# Patient Record
Sex: FEMALE | Race: OTHER | URBAN - METROPOLITAN AREA
[De-identification: names, ages, dates, MRNs, and addresses within clinical notes are randomized per-mention and may not be internally consistent; named-entity substitution may affect disease eponyms.]

---

## 2018-04-05 ENCOUNTER — APPOINTMENT (RX ONLY)
Dept: URBAN - METROPOLITAN AREA CLINIC 75 | Facility: CLINIC | Age: 53
Setting detail: DERMATOLOGY
End: 2018-04-05

## 2018-04-05 VITALS — WEIGHT: 126 LBS | HEIGHT: 61 IN

## 2018-04-05 DIAGNOSIS — L81.1 CHLOASMA: ICD-10-CM

## 2018-04-05 DIAGNOSIS — Z41.9 ENCOUNTER FOR PROCEDURE FOR PURPOSES OTHER THAN REMEDYING HEALTH STATE, UNSPECIFIED: ICD-10-CM

## 2018-04-05 DIAGNOSIS — L81.0 POSTINFLAMMATORY HYPERPIGMENTATION: ICD-10-CM

## 2018-04-05 PROBLEM — J30.1 ALLERGIC RHINITIS DUE TO POLLEN: Status: ACTIVE | Noted: 2018-04-05

## 2018-04-05 PROCEDURE — ? RECOMMENDATIONS

## 2018-04-05 PROCEDURE — ? IN-HOUSE DISPENSING PHARMACY

## 2018-04-05 PROCEDURE — 99202 OFFICE O/P NEW SF 15 MIN: CPT

## 2018-04-05 PROCEDURE — ? PRESCRIPTION

## 2018-04-05 PROCEDURE — ? TREATMENT REGIMEN

## 2018-04-05 PROCEDURE — ? COUNSELING

## 2018-04-05 RX ORDER — TRANEXAMIC ACID 650 MG/1
TABLET, FILM COATED ORAL
Qty: 30 | Refills: 11 | Status: ERX | COMMUNITY
Start: 2018-04-05

## 2018-04-05 RX ORDER — BETAMETHASONE DIPROPIONATE 0.5 MG/G
OINTMENT TOPICAL
Qty: 1 | Refills: 1 | Status: ERX | COMMUNITY
Start: 2018-04-05

## 2018-04-05 RX ADMIN — TRANEXAMIC ACID: 650 TABLET, FILM COATED ORAL at 00:00

## 2018-04-05 RX ADMIN — BETAMETHASONE DIPROPIONATE: 0.5 OINTMENT TOPICAL at 00:00

## 2018-04-05 ASSESSMENT — LOCATION DETAILED DESCRIPTION DERM
LOCATION DETAILED: RIGHT INFERIOR CENTRAL MALAR CHEEK
LOCATION DETAILED: LEFT CENTRAL MALAR CHEEK
LOCATION DETAILED: LEFT SUPERIOR UPPER BACK

## 2018-04-05 ASSESSMENT — LOCATION ZONE DERM
LOCATION ZONE: FACE
LOCATION ZONE: TRUNK

## 2018-04-05 ASSESSMENT — LOCATION SIMPLE DESCRIPTION DERM
LOCATION SIMPLE: LEFT CHEEK
LOCATION SIMPLE: LEFT UPPER BACK
LOCATION SIMPLE: RIGHT CHEEK

## 2018-04-05 NOTE — PROCEDURE: IN-HOUSE DISPENSING PHARMACY
Product 51 Amount/Unit (Numbers Only): 15
Product 3 Application Directions: Apply to affected area before moisturizer one time a day.
Product 58 Units Dispensed: 0
Product 10 Application Directions: Apply to affected areas one time per day.
Product 6 Refills: 6
Render Product Pricing In Note: Yes
Product 59 Unit Type: mg
Product 12 Unit Type: grams
Product 11 Application Directions: Apply to affected areas BID.
Product 8 Application Directions: Apply to affected areas once daily.
Product 14 Unit Type: cc's
Product 6 Amount/Unit (Numbers Only): 30
Product 3 Amount/Unit (Numbers Only): 30
Product 6 Application Directions: Apply to affected area after moisturizer one time a day.
Name Of Product 4: Taza 0.1% Cream - 282223
Detail Level: Zone
Name Of Product 5: Herman/Clind Combo Gel - 576995
Name Of Product 3: Acne Gel w/ Dapsone 7.5% - 945831
Name Of Product 2: Tret 0.05% Cream - 948965
Product 2 Application Directions: Apply to affected area in the evening after moisturizer.  Avoid eyelids.
Product 10 Price/Unit (In Dollars): 45.00
Product 5 Price/Unit (In Dollars): 40.00
Product 7 Units Dispensed: 1
Product 1 Application Directions: Apply to affected area in evening after moisturizer. Avoid eyelids.
Product 3 Price/Unit (In Dollars): 50.00
Product 15 Amount/Unit (Numbers Only): 60
Name Of Product 9: Imiqui/Levo Gel - 720250
Product 4 Application Directions: Apply to affected area in the evening after moisturizer.  Avoid eyelids.
Name Of Product 10: Clob Ointment - 014293
Name Of Product 11: Tacro Ointment - 497874
Product 5 Application Directions: Apply to acne prone areas after moisturizer one time daily.
Product 7 Application Directions: Apply to affected areas after moisturizer at night.
Name Of Product 8: Ivermec Met Gel - 111152
Product 10 Amount/Unit (Numbers Only): 60
Product 9 Application Directions: Apply to affected areas one time daily or every other day.
Name Of Product 7: Hydroquin Combo Cream - 107287
Name Of Product 6: Adap Combo Cream - 971473
Name Of Product 1: Clind/Tret Combo Cream - 030836

## 2018-04-05 NOTE — HPI: SKIN LESIONS
Is This A New Presentation, Or A Follow-Up?: Skin Lesions
How Severe Is Your Skin Lesion?: mild
Have Your Skin Lesions Been Treated?: not been treated
Additional History: Patient would also like to discuss fillers and torn earlobes.

## 2018-04-05 NOTE — PROCEDURE: TREATMENT REGIMEN
Detail Level: Zone
Plan: Alvarado hydroquinone to brown spots nightly after moisturizer, 3 months on and 3 months off

## 2018-04-05 NOTE — PROCEDURE: RECOMMENDATIONS
Detail Level: Zone
Recommendation Preamble: The following recommendations were made during the visit:
Recommendations (Free Text): Lumiere eye cream for bags\\nEarlobe repair (both ears)

## 2020-12-02 ENCOUNTER — APPOINTMENT (RX ONLY)
Dept: URBAN - METROPOLITAN AREA CLINIC 93 | Facility: CLINIC | Age: 55
Setting detail: DERMATOLOGY
End: 2020-12-02

## 2020-12-02 VITALS — TEMPERATURE: 97.5 F

## 2020-12-02 DIAGNOSIS — L81.1 CHLOASMA: ICD-10-CM | Status: INADEQUATELY CONTROLLED

## 2020-12-02 PROCEDURE — ? PRESCRIPTION

## 2020-12-02 PROCEDURE — ? TREATMENT REGIMEN

## 2020-12-02 PROCEDURE — ? COUNSELING

## 2020-12-02 PROCEDURE — 99213 OFFICE O/P EST LOW 20 MIN: CPT

## 2020-12-02 RX ORDER — AZELAIC ACID 0.15 G/G
GEL TOPICAL
Qty: 1 | Refills: 6 | Status: ERX | COMMUNITY
Start: 2020-12-02

## 2020-12-02 RX ADMIN — AZELAIC ACID: 0.15 GEL TOPICAL at 00:00

## 2020-12-02 ASSESSMENT — LOCATION SIMPLE DESCRIPTION DERM: LOCATION SIMPLE: LEFT CHEEK

## 2020-12-02 ASSESSMENT — LOCATION ZONE DERM: LOCATION ZONE: FACE

## 2020-12-02 ASSESSMENT — LOCATION DETAILED DESCRIPTION DERM: LOCATION DETAILED: LEFT INFERIOR CENTRAL MALAR CHEEK

## 2020-12-02 ASSESSMENT — SEVERITY ASSESSMENT: SEVERITY: MODERATE, MODERATELY DARKER THAN THE SURROUNDING NORMAL SKIN

## 2020-12-02 NOTE — PROCEDURE: TREATMENT REGIMEN
Detail Level: Zone
Plan: Discussed in length as well about treating with the Clear and Brilliant laser resurfacing